# Patient Record
Sex: MALE | Race: WHITE
[De-identification: names, ages, dates, MRNs, and addresses within clinical notes are randomized per-mention and may not be internally consistent; named-entity substitution may affect disease eponyms.]

---

## 2018-03-03 ENCOUNTER — HOSPITAL ENCOUNTER (OUTPATIENT)
Dept: HOSPITAL 17 - NEPC | Age: 83
Setting detail: OBSERVATION
LOS: 2 days | Discharge: HOME HEALTH | End: 2018-03-05
Attending: HOSPITALIST | Admitting: HOSPITALIST
Payer: MEDICARE

## 2018-03-03 VITALS
RESPIRATION RATE: 18 BRPM | OXYGEN SATURATION: 96 % | HEART RATE: 72 BPM | SYSTOLIC BLOOD PRESSURE: 176 MMHG | DIASTOLIC BLOOD PRESSURE: 86 MMHG

## 2018-03-03 VITALS
TEMPERATURE: 96.1 F | RESPIRATION RATE: 18 BRPM | OXYGEN SATURATION: 95 % | DIASTOLIC BLOOD PRESSURE: 91 MMHG | HEART RATE: 70 BPM | SYSTOLIC BLOOD PRESSURE: 172 MMHG

## 2018-03-03 VITALS
TEMPERATURE: 98 F | HEART RATE: 70 BPM | OXYGEN SATURATION: 98 % | DIASTOLIC BLOOD PRESSURE: 65 MMHG | RESPIRATION RATE: 16 BRPM | SYSTOLIC BLOOD PRESSURE: 139 MMHG

## 2018-03-03 VITALS
OXYGEN SATURATION: 98 % | HEART RATE: 70 BPM | SYSTOLIC BLOOD PRESSURE: 175 MMHG | DIASTOLIC BLOOD PRESSURE: 78 MMHG | RESPIRATION RATE: 18 BRPM | TEMPERATURE: 96.1 F

## 2018-03-03 VITALS — HEIGHT: 72 IN | BODY MASS INDEX: 22.75 KG/M2 | WEIGHT: 167.99 LBS

## 2018-03-03 VITALS — OXYGEN SATURATION: 95 %

## 2018-03-03 DIAGNOSIS — E78.1: ICD-10-CM

## 2018-03-03 DIAGNOSIS — Z87.891: ICD-10-CM

## 2018-03-03 DIAGNOSIS — H54.8: ICD-10-CM

## 2018-03-03 DIAGNOSIS — H21.01: Primary | ICD-10-CM

## 2018-03-03 DIAGNOSIS — E11.51: ICD-10-CM

## 2018-03-03 DIAGNOSIS — E78.5: ICD-10-CM

## 2018-03-03 DIAGNOSIS — Z95.1: ICD-10-CM

## 2018-03-03 DIAGNOSIS — Z79.4: ICD-10-CM

## 2018-03-03 DIAGNOSIS — Z95.810: ICD-10-CM

## 2018-03-03 DIAGNOSIS — N28.9: ICD-10-CM

## 2018-03-03 DIAGNOSIS — E03.9: ICD-10-CM

## 2018-03-03 DIAGNOSIS — R79.1: ICD-10-CM

## 2018-03-03 DIAGNOSIS — Z95.5: ICD-10-CM

## 2018-03-03 DIAGNOSIS — Z79.01: ICD-10-CM

## 2018-03-03 DIAGNOSIS — I25.10: ICD-10-CM

## 2018-03-03 LAB
BASOPHILS # BLD AUTO: 0 TH/MM3 (ref 0–0.2)
BASOPHILS NFR BLD: 0.3 % (ref 0–2)
BUN SERPL-MCNC: 19 MG/DL (ref 7–18)
CALCIUM SERPL-MCNC: 9.2 MG/DL (ref 8.5–10.1)
CHLORIDE SERPL-SCNC: 100 MEQ/L (ref 98–107)
CREAT SERPL-MCNC: 1.56 MG/DL (ref 0.6–1.3)
EOSINOPHIL # BLD: 0.1 TH/MM3 (ref 0–0.4)
EOSINOPHIL NFR BLD: 0.9 % (ref 0–4)
ERYTHROCYTE [DISTWIDTH] IN BLOOD BY AUTOMATED COUNT: 13.6 % (ref 11.6–17.2)
GFR SERPLBLD BASED ON 1.73 SQ M-ARVRAT: 43 ML/MIN (ref 89–?)
GLUCOSE SERPL-MCNC: 247 MG/DL (ref 74–106)
HCO3 BLD-SCNC: 26.7 MEQ/L (ref 21–32)
HCT VFR BLD CALC: 45.6 % (ref 39–51)
HGB BLD-MCNC: 15.6 GM/DL (ref 13–17)
INR PPP: 3.4 RATIO
LYMPHOCYTES # BLD AUTO: 1 TH/MM3 (ref 1–4.8)
LYMPHOCYTES NFR BLD AUTO: 15.9 % (ref 9–44)
MCH RBC QN AUTO: 31.9 PG (ref 27–34)
MCHC RBC AUTO-ENTMCNC: 34.3 % (ref 32–36)
MCV RBC AUTO: 93.1 FL (ref 80–100)
MONOCYTE #: 0.7 TH/MM3 (ref 0–0.9)
MONOCYTES NFR BLD: 10.9 % (ref 0–8)
NEUTROPHILS # BLD AUTO: 4.6 TH/MM3 (ref 1.8–7.7)
NEUTROPHILS NFR BLD AUTO: 72 % (ref 16–70)
PLATELET # BLD: 123 TH/MM3 (ref 150–450)
PMV BLD AUTO: 8.5 FL (ref 7–11)
PROTHROMBIN TIME: 34.6 SEC (ref 9.8–11.6)
RBC # BLD AUTO: 4.9 MIL/MM3 (ref 4.5–5.9)
SODIUM SERPL-SCNC: 133 MEQ/L (ref 136–145)
WBC # BLD AUTO: 6.4 TH/MM3 (ref 4–11)

## 2018-03-03 PROCEDURE — 70486 CT MAXILLOFACIAL W/O DYE: CPT

## 2018-03-03 PROCEDURE — 80048 BASIC METABOLIC PNL TOTAL CA: CPT

## 2018-03-03 PROCEDURE — 93005 ELECTROCARDIOGRAM TRACING: CPT

## 2018-03-03 PROCEDURE — 82948 REAGENT STRIP/BLOOD GLUCOSE: CPT

## 2018-03-03 PROCEDURE — 85730 THROMBOPLASTIN TIME PARTIAL: CPT

## 2018-03-03 PROCEDURE — 96372 THER/PROPH/DIAG INJ SC/IM: CPT

## 2018-03-03 PROCEDURE — 70450 CT HEAD/BRAIN W/O DYE: CPT

## 2018-03-03 PROCEDURE — 85025 COMPLETE CBC W/AUTO DIFF WBC: CPT

## 2018-03-03 PROCEDURE — 99285 EMERGENCY DEPT VISIT HI MDM: CPT

## 2018-03-03 PROCEDURE — G0378 HOSPITAL OBSERVATION PER HR: HCPCS

## 2018-03-03 PROCEDURE — C9132 KCENTRA, PER I.U.: HCPCS

## 2018-03-03 PROCEDURE — 85610 PROTHROMBIN TIME: CPT

## 2018-03-03 RX ADMIN — INSULIN ASPART SCH: 100 INJECTION, SOLUTION INTRAVENOUS; SUBCUTANEOUS at 21:00

## 2018-03-03 RX ADMIN — CYCLOPENTOLATE HYDROCHLORIDE SCH DROP: 10 SOLUTION/ DROPS OPHTHALMIC at 19:59

## 2018-03-03 RX ADMIN — PREDNISOLONE ACETATE SCH DROP: 10 SUSPENSION/ DROPS OPHTHALMIC at 21:00

## 2018-03-03 RX ADMIN — INSULIN ASPART SCH: 100 INJECTION, SOLUTION INTRAVENOUS; SUBCUTANEOUS at 16:52

## 2018-03-03 RX ADMIN — STANDARDIZED SENNA CONCENTRATE AND DOCUSATE SODIUM SCH TAB: 8.6; 5 TABLET, FILM COATED ORAL at 21:34

## 2018-03-03 RX ADMIN — PREDNISOLONE ACETATE SCH DROP: 10 SUSPENSION/ DROPS OPHTHALMIC at 19:59

## 2018-03-03 RX ADMIN — CYCLOPENTOLATE HYDROCHLORIDE SCH DROP: 10 SOLUTION/ DROPS OPHTHALMIC at 21:00

## 2018-03-03 RX ADMIN — Medication SCH ML: at 21:35

## 2018-03-03 RX ADMIN — PRAVASTATIN SODIUM SCH MG: 80 TABLET ORAL at 21:33

## 2018-03-03 NOTE — PD
HPI


Chief Complaint:  Eye Problems/Injury


Time Seen by Provider:  10:05


Travel History


International Travel<30 days:  No


Contact w/Intl Traveler<30days:  No


Traveled to known affect area:  No





History of Present Illness


HPI


Patient complains of hazy right eye vision that started since last night while 

he was playing with his iPad.  Patient denies any eye pain.  Patient stated 

that he felt like he had some tearing in his right eye and when he started to 

press buttons on the iPad and he essentially the right eye became very blurred 

he just continue to play with one eye open particularly his left eye.  And did 

not seem to have any major concern until this morning when he could not see out 

of his right eye because it was very blurry.  Patient states that he is on 

Coumadin.





PFSH


Past Medical History


Hx Anticoagulant Therapy:  Yes (COUMADIN)


Cardiovascular Problems:  Yes (PACEMAKER)





Social History


Tobacco Use:  No





Allergies-Medications


(Allergen,Severity, Reaction):  


Coded Allergies:  


     No Known Allergies (Verified  Allergy, Unknown, 3/3/18)


     MRI PRECAUTION (Verified  Adverse Reaction, Unknown, NON COMPATIBLE 

PACEMAKER DEFIBRILLATOR, 3/4/18)


 MODEL # [id number], SERIAL #[id number], JAN [birth year] 2014


Reported Meds & Prescriptions





Reported Meds & Active Scripts


Active


Reported


Lantus Inj (Insulin Glargine) 1,000 Unit/10 Ml Vial 32 Units SQ DAILY


Preservision Areds (Multiple Vitamins W/ Minerals) 1 Tab 2 Tab PO DAILY


Aspirin 81 Mg Chew 81 Mg CHEW DAILY


Warfarin 5 Mg Tab 5 Mg PO DAILY


[Thyroxine]   25 Mg PO DAILY


Tricor (Fenofibrate) 48 Mg Tab 48 Mg PO DAILY


     Tke with food.


Simvastatin 40 Mg Tab 40 Mg PO HS


Januvia (Sitagliptin Phosphate) 100 Mg Tab 100 Mg PO DAILY


Digoxin 0.25 Mg Tab 0.25 Mg PO DAILY


Vitamin D3 (Cholecalciferol) 1,000 Unit Cap 1,000 Units  DAILY








Physical Exam


Narrative


GENERAL: 


SKIN: Warm and dry.


HEAD: Atraumatic. Normocephalic. 


EYES: Pupils equal and round. No scleral icterus. No injection or drainage.  

Noted is a right partial hyphema with 20% coverage max, after patient was 

seated position he is vision improved to motion.  Proparacaine and fluorescein 

test did not show any Victor Hugo's sign, did not show any uptake.  Pavan-Pen 

pressure 14 on left and 16 on right eye, additionally left eye is 20/400


ENT: No nasal bleeding or discharge.  Mucous membranes pink and moist.


NECK: Trachea midline. No JVD. 


CARDIOVASCULAR: Regular rate and rhythm.  


RESPIRATORY: No accessory muscle use. Clear to auscultation. Breath sounds 

equal bilaterally. 


GASTROINTESTINAL: Abdomen soft, non-tender, nondistended. 


MUSCULOSKELETAL: Extremities without clubbing, cyanosis, or edema. No obvious 

deformities. 


NEUROLOGICAL: Awake and alert. No obvious cranial nerve deficits.  Motor 

grossly within normal limits. Five out of 5 muscle strength in the arms and 

legs.  Normal speech.


PSYCHIATRIC: Appropriate mood and affect; insight and judgment normal.





Data


Data


Last Documented VS





Orders





 Orders


Complete Blood Count With Diff (3/3/18 10:06)


Basic Metabolic Panel (Bmp) (3/3/18 10:06)


Prothrombin Time / Inr (Pt) (3/3/18 10:06)


Act Partial Throm Time (Ptt) (3/3/18 10:06)


Ct Facial Bones W/O Iv Cont (3/3/18 )


Proparacaine 0.5% Opth Soln (Alcaine 0.5 (3/3/18 10:30)


^ Other Nursing Orders (3/3/18 10:18)


^ Lab Follow Up (3/3/18 13:04)


Prothrombin Complex Conc Inj (Kcentra In (3/3/18 13:45)


Admit Order (Ed Use Only) (3/3/18 13:48)





Labs





Laboratory Tests








Test


  3/3/18


10:05


 


White Blood Count 6.4 TH/MM3 


 


Red Blood Count 4.90 MIL/MM3 


 


Hemoglobin 15.6 GM/DL 


 


Hematocrit 45.6 % 


 


Mean Corpuscular Volume 93.1 FL 


 


Mean Corpuscular Hemoglobin 31.9 PG 


 


Mean Corpuscular Hemoglobin


Concent 34.3 % 


 


 


Red Cell Distribution Width 13.6 % 


 


Platelet Count 123 TH/MM3 


 


Mean Platelet Volume 8.5 FL 


 


Neutrophils (%) (Auto) 72.0 % 


 


Lymphocytes (%) (Auto) 15.9 % 


 


Monocytes (%) (Auto) 10.9 % 


 


Eosinophils (%) (Auto) 0.9 % 


 


Basophils (%) (Auto) 0.3 % 


 


Neutrophils # (Auto) 4.6 TH/MM3 


 


Lymphocytes # (Auto) 1.0 TH/MM3 


 


Monocytes # (Auto) 0.7 TH/MM3 


 


Eosinophils # (Auto) 0.1 TH/MM3 


 


Basophils # (Auto) 0.0 TH/MM3 


 


CBC Comment DIFF FINAL 


 


Differential Comment  


 


Prothrombin Time 34.6 SEC 


 


Prothromb Time International


Ratio 3.4 RATIO 


 


 


Activated Partial


Thromboplast Time 42.5 SEC 


 


 


Blood Urea Nitrogen 19 MG/DL 


 


Creatinine 1.56 MG/DL 


 


Random Glucose 247 MG/DL 


 


Calcium Level 9.2 MG/DL 


 


Sodium Level 133 MEQ/L 


 


Potassium Level 4.4 MEQ/L 


 


Chloride Level 100 MEQ/L 


 


Carbon Dioxide Level 26.7 MEQ/L 


 


Anion Gap 6 MEQ/L 


 


Estimat Glomerular Filtration


Rate 43 ML/MIN 


 











MDM


Medical Decision Making


Medical Screen Exam Complete:  Yes


Emergency Medical Condition:  Yes


Medical Record Reviewed:  Yes


Differential Diagnosis


Retrobulbar hemorrhage versus hyphema versus retinal hemorrhage


Narrative Course


On CT and abnormal soft tissue presumably hemorrhage in the superolateral right 

lobe anteriorly the remainder of the examination is unremarkable per 

radiologist.... Upon discussing further with the radiologist today he states 

that this hemorrhage is intraorbital within the orbit measuring approximately 

11 x 4 mm....coumadin stopped and kcentra given asap with repeat decline on 

repeat inr 3 to 1.7


Critical Care Narrative


CRITICAL CARE NOTE: With evaluation of the patient, labs, EKG, receipt of 

radiologic studies, administration of medications, reevaluation the patient and 

discussion of the patient with the admitting physicians, the total critical 

care time was [30] minutes. Time to perform other separately billable 

procedures was not included in the critical care time.





Physician Communication


Physician Communication


d/w dr jona chris on call for dr rosina mcnulty , who will follow patient up 

in hospital.





Diagnosis





 Primary Impression:  


 Hyphema, right eye


 Additional Impression:  


 Intraorbital hemorrhage right





Admitting Information


Admitting Physician Requests:  Observation


Scripts


Prednisolone Acetate Opth (Prednisolone Acetate Opth) 1% Susp


1 DROP RIGHT EYE QID for hyphema for 14 Days, ML


   Prov: Henri Jesus MD         3/5/18











Den Jones MD Mar 3, 2018 10:06

## 2018-03-03 NOTE — HHI.HP
__________________________________________________





[facility]


Service


[facility] ([place], [place])ists


Primary Care Physician


No Primary Care Physician


Admission Diagnosis


HYPHEMA AND INTRAORBITAL HEMORRHAGE RT EYE


Diagnoses:  


Travel History


International Travel<30 Days:  No


Contact w/Intl Traveler <30 Da:  No


Traveled to Known Affected Are:  No


History of Present Illness


82 year old  male with history of CAD, IDDM, and PAD presented to the 

ER for right visual loss. He reports yesterday evening he spent a few hours 

squinting with his right eye trying to see the iPad and when he put it down he 

realized his vision was blurry. This morning when he awoke he states he 

completely had no vision in that eye. His wife commented that when she looked 

at his eyes the right pupil was dilated. The patient states at baseline he is 

legally blind and his right eye vision is worse than his left. He denies any 

diplopia, photophobia, headache, nausea, or vomiting. He states he had some 

mild tenderness when he touched his right eyelid this morning otherwise the eye 

is not painful. At baseline, though, he is a little unstable on his feet and 

gets dizzy from time to time but nothing that has acutely worsened. He is on 

Coumadin reportedly for multiple bypass procedures and PAD, and recalls his 

last INR was 2.8 somewhere between 2-4 weeks ago. He denies bleeding elsewhere. 

He states he did not fall or have any recent trauma. He reports since this 

morning his vision has improved but is still poor.





He moved to the area over the summer of 2017 and his cardiologist and 

endocrinologist are in [place].





Review of Systems


Constitutional:  COMPLAINS OF: Dizziness, DENIES: Fever, Chills


Endocrine:  DENIES: Polyuria


Eyes:  COMPLAINS OF: Blurred vision, Vision loss, DENIES: Diplopia, Eye 

inflammation, Eye pain, Photosensitivity, Double Vision


Ears, nose, mouth, throat:  COMPLAINS OF: Hearing loss, DENIES: Throat pain, 

Hoarseness


Respiratory:  DENIES: Cough, Wheezing, Shortness of breath


Cardiovascular:  DENIES: Chest pain, Palpitations, Syncope, Lower Extremity 

Edema


Gastrointestinal:  DENIES: Abdominal pain, Black stools, Bloody stools, Diarrhea

, Nausea, Vomiting


Genitourinary:  DENIES: Hematuria


Musculoskeletal:  DENIES: Back pain


Integumentary:  DENIES: Rash


Hematologic/lymphatic:  DENIES: Bruising


Neurologic:  COMPLAINS OF: Poor Balance, DENIES: Abnormal gait, Headache, 

Localized weakness, Speech Problems


Psychiatric:  DENIES: Anxiety, Depression





Past Family Social History


Past Medical History


Diabetes mellitus


Coronary artery disease s/p CABG (last cath in 2018 showed 90% blockage in 

graft)


Peripheral artery disease


Hypothyroidism


Hyperlipidemia


Past Surgical History


Triple bypass x 2


7 cardiac stents


AICD placement


Reported Medications


Lantus Inj (Insulin Glargine) 1,000 Unit/10 Ml Vial 32 Units SQ DAILY


Preservision Areds (Multiple Vitamins W/ Minerals) 1 Tab 2 Tab PO DAILY


Aspirin 81 Mg Chew 81 Mg CHEW DAILY


Warfarin 5 Mg Tab 5 Mg PO DAILY


Synthroid 25 Mcg PO DAILY


Tricor (Fenofibrate) 48 Mg Tab 48 Mg PO DAILY


Simvastatin 40 Mg Tab 40 Mg PO HS


Januvia (Sitagliptin Phosphate) 100 Mg Tab 100 Mg PO DAILY


Digoxin 0.25 Mg Tab 0.25 Mg PO DAILY


Vitamin D3 (Cholecalciferol) 1,000 Unit Cap 1,000 Units  DAILY


Allergies:  


Coded Allergies:  


     No Known Allergies (Verified  Allergy, Unknown, 3/3/18)


Active Ordered Medications


Acetaminophen (Tylenol) 650 mg Q4H  PRN PO;  Start 3/3/18 at 14:45


Bisacodyl (Dulcolax Supp) 10 mg DAILY  PRN RECTAL;  Start 3/3/18 at 14:45


Cholecalciferol (Vitamin D3) 1,000 units DAILY PO;  Start 3/4/18 at 09:00


Dextrose (D50w (Vial) Inj) 50 ml UNSCH  PRN IV PUSH;  Start 3/3/18 at 14:45


Digoxin (Lanoxin) 0.25 mg DAILY PO;  Start 3/4/18 at 09:00


Fenofibrate (Tricor) 48 mg DAILY PO;  Start 3/4/18 at 09:00


Glucagon (Glucagon Inj) 1 mg UNSCH  PRN OTHER;  Start 3/3/18 at 14:45


Insulin Aspart (NovoLOG SUPPLEMENTAL SCALE) 1 ACHS SLIDING  SCALE SQ;  Start 3/3

/18 at 17:00


Insulin Detemir (Levemir Inj) 32 units DAILY SQ;  Start 3/4/18 at 09:00


Lactulose (Lactulose Liq) 30 ml DAILY  PRN PO;  Start 3/3/18 at 14:45


Levothyroxine Sodium (Synthroid) 25 mcg DAILY@0600 PO;  Start 3/4/18 at 06:00


Magnesium Hydroxide (Milk Of Magnesia Liq) 30 ml Q12H  PRN PO;  Start 3/3/18 at 

14:45


Multivitamins/ Minerals Therapeutic (Theragran M Tab) 2 tab DAILY PO;  Start 3/4

/18 at 09:00


Naloxone HCl (Narcan Inj) 0.4 mg UNSCH  PRN IV PUSH;  Start 3/3/18 at 14:45


Pravastatin Sodium (Pravachol) 80 mg HS PO;  Start 3/3/18 at 21:00


Proparacaine HCl (Alcaine 0.5% Opht Soln) 1 drop ONCE  ONCE RIGHT EYE;  Start 3/

3/18 at 10:30;  Stop 3/3/18 at 10:31;  Status DC


Prothrombin Complex Concent (Human) 2000 units/Syringe / Bag 0 ml @ 500 mls/hr 

ONCE  ONCE IV Last administered on 3/3/18at 14:09; Admin Dose 500 MLS/HR;  

Start 3/3/18 at 13:45;  Stop 3/3/18 at 13:46;  Status DC


Senna/Docusate Sodium (Roxane-Colace) 1 tab BID PO;  Start 3/3/18 at 21:00


Sennosides (Senokot) 17.2 mg Q12H  PRN PO;  Start 3/3/18 at 14:45


Sitagliptin Phosphate (Januvia) 100 mg DAILY PO;  Start 3/4/18 at 09:00


Sodium Chloride (NS Flush) 2 ml BID IV FLUSH;  Start 3/3/18 at 21:00


Sodium Chloride (NS Flush) 2 ml UNSCH  PRN IV FLUSH;  Start 3/3/18 at 14:45


Family History


Heart disease in parents


Social History


Lives with his wife


[employer]


EtOH: quit in 


Tobacco history: quit 





Physical Exam


Vital Signs





Vital Signs








  Date Time  Temp Pulse Resp B/P (MAP) Pulse Ox O2 Delivery O2 Flow Rate FiO2


 


3/3/18 10:02   18     


 


3/3/18 09:49 98.0 70 16 139/65 (89) 98   








Physical Exam


GENERAL: Well-nourished, well-developed elderly  male. Pleasant, 

sitting up comfortably in bed, in no acute distress.


SKIN: Warm and dry. Few ecchymoses over upper extremities.


HEENT: Atraumatic. Normocephalic. No temporal or scalp tenderness. Small 

hyphema right eye. Right pupil slightly larger than left, both reactive to 

light. Unable to assess accommodation as patient loses focus of my finger as it 

approaches closer to him. Extraocular motions intact. No scleral icterus. Nose 

without bleeding, purulent drainage or septal hematoma. Throat without erythema

, tonsillar hypertrophy or exudate. Uvula midline. Airway patent.


NECK: Trachea midline. No JVD or lymphadenopathy. Supple, nontender, no 

meningeal signs.


CARDIOVASCULAR: Regular rate and rhythm without murmurs, gallops, or rubs. 


RESPIRATORY: Clear to auscultation. Breath sounds equal bilaterally. No wheezes

, rales, or rhonchi.  


GASTROINTESTINAL: Abdomen soft, nontender, nondistended. No hepatosplenomegaly 

or palpable masses. No guarding.


MUSCULOSKELETAL: Extremities without clubbing, cyanosis, or edema. No joint 

tenderness, effusion, or edema noted. No calf tenderness. Negative Homans sign 

bilaterally.


NEUROLOGICAL: Awake and alert. Cranial nerves II through XII intact.  Motor and 

sensory grossly within normal limits. Normal speech. Extremely hard of hearing.


Laboratory





Laboratory Tests








Test


  3/3/18


10:05


 


White Blood Count 6.4 


 


Red Blood Count 4.90 


 


Hemoglobin 15.6 


 


Hematocrit 45.6 


 


Mean Corpuscular Volume 93.1 


 


Mean Corpuscular Hemoglobin 31.9 


 


Mean Corpuscular Hemoglobin


Concent 34.3 


 


 


Red Cell Distribution Width 13.6 


 


Platelet Count 123 


 


Mean Platelet Volume 8.5 


 


Neutrophils (%) (Auto) 72.0 


 


Lymphocytes (%) (Auto) 15.9 


 


Monocytes (%) (Auto) 10.9 


 


Eosinophils (%) (Auto) 0.9 


 


Basophils (%) (Auto) 0.3 


 


Neutrophils # (Auto) 4.6 


 


Lymphocytes # (Auto) 1.0 


 


Monocytes # (Auto) 0.7 


 


Eosinophils # (Auto) 0.1 


 


Basophils # (Auto) 0.0 


 


CBC Comment DIFF FINAL 


 


Differential Comment  


 


Prothrombin Time 34.6 


 


Prothromb Time International


Ratio 3.4 


 


 


Activated Partial


Thromboplast Time 42.5 


 


 


Blood Urea Nitrogen 19 


 


Creatinine 1.56 


 


Random Glucose 247 


 


Calcium Level 9.2 


 


Sodium Level 133 


 


Potassium Level 4.4 


 


Chloride Level 100 


 


Carbon Dioxide Level 26.7 


 


Anion Gap 6 


 


Estimat Glomerular Filtration


Rate 43 


 








Result Diagram:  


3/3/18 1005                                                                    

            3/3/18 1005





Imaging





Last Impressions








Maxillofacial CT 3/3/18 0000 Signed





Impressions: 





 Service Date/Time:  Saturday, March 3, 2018 10:57 - CONCLUSION:  Abnormal soft 





 tissue presumably hemorrhage in the supralateral right globe anteriorly. Rest 

of 





 study is unremarkable.     Edilson Dubose MD ADDENDUM:   The hemorrhages 





 within the globe.   Edilson Dubose MD 











Capjj VTE Risk Assessment


Caprinmadison VTE Risk Assessment:  Mod/High Risk (score >= 2)


VTE Pharm Contraindication:  Coagulopathy,INR elevated


Capkbi Risk Assessment Model











 Point Value = 1          Point Value = 2  Point Value = 3  Point Value = 5


 


Age 41-60


Minor surgery


BMI > 25 kg/m2


Swollen legs


Varicose veins


Pregnancy or postpartum


History of unexplained or recurrent


   spontaneous 


Oral contraceptives or hormone


   replacement


Sepsis (< 1 month)


Serious lung disease, including


   pneumonia (< 1 month)


Abnormal pulmonary function


Acute myocardial infarction


Congestive heart failure (< 1 month)


History of inflammatory bowel disease


Medical patient at bed rest Age 61-74


Arthroscopic surgery


Major open surgery (> 45 min)


Laparoscopic surgery (> 45 min)


Malignancy


Confined to bed (> 72 hours)


Immobilizing plaster cast


Central venous access Age >= 75


History of VTE


Family history of VTE


Factor V Leiden


Prothrombin 58448S


Lupus anticoagulant


Anticardiolipin antibodies


Elevated serum homocysteine


Heparin-induced thrombocytopenia


Other congenital or acquired


   thrombophilia Stroke (< 1 month)


Elective arthroplasty


Hip, pelvis, or leg fracture


Acute spinal cord injury (< 1 month)








Prophylaxis Regimen











   Total Risk


Factor Score Risk Level Prophylaxis Regimen


 


0-1      Low Early ambulation


 


2 Moderate Order ONE of the following:


*Sequential Compression Device (SCD)


*Heparin 5000 units SQ BID


 


3-4 Higher Order ONE of the following medications:


*Heparin 5000 units SQ TID


*Enoxaparin/Lovenox 40 mg SQ daily (WT < 150 kg, CrCl > 30 mL/min)


*Enoxaparin/Lovenox 30 mg SQ daily (WT < 150 kg, CrCl > 10-29 mL/min)


*Enoxaparin/Lovenox 30 mg SQ BID (WT < 150 kg, CrCl > 30 mL/min)


AND/OR


*Sequential Compression Device (SCD)


 


5 or more Highest Order ONE of the following medications:


*Heparin 5000 units SQ TID (Preferred with Epidurals)


*Enoxaparin/Lovenox 40 mg SQ daily (WT < 150 kg, CrCl > 30 mL/min)


*Enoxaparin/Lovenox 30 mg SQ daily (WT < 150 kg, CrCl > 10-29 mL/min)


*Enoxaparin/Lovenox 30 mg SQ BID (WT < 150 kg, CrCl > 30 mL/min)


AND


*Sequential Compression Device (SCD)











Assessment and Plan


Problem List:  


(1) Hyphema, right eye


ICD Code:  H21.01 - Hyphema, right eye


Status:  Acute


(2) Supratherapeutic INR


ICD Code:  R79.1 - Abnormal coagulation profile


(3) CAD (coronary artery disease)


ICD Code:  I25.10 - Atherosclerotic heart disease of native coronary artery 

without angina pectoris


Status:  Chronic


(4) PAD (peripheral artery disease)


ICD Code:  I73.9 - Peripheral vascular disease, unspecified


Status:  Chronic


(5) Hypothyroidism


ICD Code:  E03.9 - Hypothyroidism, unspecified


Status:  Chronic


(6) Diabetes mellitus


ICD Code:  E11.9 - Type 2 diabetes mellitus without complications


Status:  Chronic


Assessment and Plan


82-year-old male admitted under observation for evaluation of right eye hyphema 

and associated visual loss, and patient was found to have supratherapeutic INR 

of 3.4.





Right eye hyphema and visual loss


CT reveals hemorrhage within the right globe


Clinically, the hyphema is small, nontraumatic, and nonpainful 


Ophthalmology consulted


INR 3.4 in the ED; hold Coumadin and ASA


Vitamin K ordered and administered in the ED





Diabetes mellitus


SSI per protocol


Continue home Lantus and Januvia





Coronary artery disease


Continue digoxin


Holding aspirin and Coumadin in light of hyphema


Will attempt to reach out to cardiologist to discuss continuing Coumadin





Hyperlipidemia/hypertriglyceridemia


Continue home fenofibrate and statin





Hypothyroidism


Continue home Synthroid





Renal insufficiency


No baseline creatinine to compare to 


Will monitor and avoid nephrotoxic agents





DVT prophylaxis


Holding chemical anticoagulation given supratherapeutic INR





D/C Planning


Anticipate D/C tomorrow with f/u with ophthalmologist


Code Status


FULL


Discussed Condition With


Dr. Jones, patient, and family











Lanette Robert MD Mar 3, 2018 14:08

## 2018-03-03 NOTE — RADRPT
EXAM DATE/TIME:  03/03/2018 10:57 

 

This report includes an Addendum and supersedes previous reports for this exam.

 

 

 

 

[facility] COMPARISON:     

No previous studies available for comparison.

 

 

INDICATIONS :     

Right eye draining, pupil dialated; evaluate for retrobulbar hemorrhage. 

                      

 

RADIATION DOSE:     

37.66 CTDIvol (mGy) 

 

 

MEDICAL HISTORY :     

Stroke. Cardiovascular disease Diabetes mellitus type 2.

 

SURGICAL HISTORY :      

Pacemaker. Defibrillator.CABG

 

ENCOUNTER:      

Initial

 

ACUITY:      

1 day

 

PAIN SCORE:      

3/10

 

LOCATION:       

Right facial 

 

TECHNIQUE:     

Volumetric scanning of the facial bones was performed.  Using automated exposure control and adjustme
nt of the mA and/or kV according to patient size, radiation dose was kept as low as reasonably achiev
able to obtain optimal diagnostic quality images.  DICOM format image data is available electronicall
y for review and comparison.  

 

FINDINGS:     

 

ORBITS:     

There is abnormal soft tissue in the superior lateral right globe presumably hemorrhage. The rest of 
the right globe and the entire left lobe are unremarkable.  Abnormal soft tissue measures 11 x 4 mm

 

NASAL BONE:     

The nasal bone and maxillary spine are intact

 

ZYGOMATIC ARCHES:     

Symmetric without evidence of fracture.

 

SINUSES:     

The maxillary, ethmoid and frontal sinuses are intact.  No air-fluid levels seen.

 

NASAL CAVITY:     

The nasal septum is intact and midline.  The lacrimal ducts are intact.

 

SOFT TISSUES:     

No radiopaque foreign bodies seen.  No soft-tissue swelling is seen.

 

INTRACRANIAL:     

No intracranial air seen.

 

CRIBIFORM PLATE:     

Grossly intact.

 

CONCLUSION:     

Abnormal soft tissue presumably hemorrhage in the supralateral right globe anteriorly. Rest of study 
is unremarkable.

 

 

 

 Edilson Dubose MD on March 03, 2018 at 11:18           

Board Certified Radiologist.

 This report was verified electronically. 

 

ADDENDUM: 

 

The hemorrhages within the globe.

 

 Edilson Dubose MD on March 03, 2018 at 13:19           

Board Certified Radiologist.

 This report was verified electronically.

## 2018-03-04 VITALS
RESPIRATION RATE: 18 BRPM | OXYGEN SATURATION: 98 % | DIASTOLIC BLOOD PRESSURE: 67 MMHG | TEMPERATURE: 96.6 F | SYSTOLIC BLOOD PRESSURE: 121 MMHG | HEART RATE: 70 BPM

## 2018-03-04 VITALS
RESPIRATION RATE: 18 BRPM | TEMPERATURE: 96.8 F | SYSTOLIC BLOOD PRESSURE: 121 MMHG | HEART RATE: 70 BPM | DIASTOLIC BLOOD PRESSURE: 72 MMHG | OXYGEN SATURATION: 97 %

## 2018-03-04 VITALS
DIASTOLIC BLOOD PRESSURE: 76 MMHG | SYSTOLIC BLOOD PRESSURE: 150 MMHG | RESPIRATION RATE: 18 BRPM | TEMPERATURE: 96.7 F | HEART RATE: 70 BPM | OXYGEN SATURATION: 99 %

## 2018-03-04 VITALS
OXYGEN SATURATION: 97 % | HEART RATE: 71 BPM | RESPIRATION RATE: 18 BRPM | SYSTOLIC BLOOD PRESSURE: 140 MMHG | TEMPERATURE: 96.4 F | DIASTOLIC BLOOD PRESSURE: 73 MMHG

## 2018-03-04 VITALS
SYSTOLIC BLOOD PRESSURE: 126 MMHG | OXYGEN SATURATION: 99 % | TEMPERATURE: 96.8 F | RESPIRATION RATE: 18 BRPM | DIASTOLIC BLOOD PRESSURE: 66 MMHG | HEART RATE: 70 BPM

## 2018-03-04 VITALS
DIASTOLIC BLOOD PRESSURE: 71 MMHG | HEART RATE: 70 BPM | OXYGEN SATURATION: 98 % | SYSTOLIC BLOOD PRESSURE: 141 MMHG | RESPIRATION RATE: 17 BRPM | TEMPERATURE: 96.9 F

## 2018-03-04 VITALS
SYSTOLIC BLOOD PRESSURE: 120 MMHG | HEART RATE: 70 BPM | OXYGEN SATURATION: 98 % | TEMPERATURE: 96.2 F | RESPIRATION RATE: 18 BRPM | DIASTOLIC BLOOD PRESSURE: 64 MMHG

## 2018-03-04 LAB
BASOPHILS # BLD AUTO: 0 TH/MM3 (ref 0–0.2)
BASOPHILS NFR BLD: 0.3 % (ref 0–2)
BUN SERPL-MCNC: 16 MG/DL (ref 7–18)
CALCIUM SERPL-MCNC: 8.9 MG/DL (ref 8.5–10.1)
CHLORIDE SERPL-SCNC: 103 MEQ/L (ref 98–107)
CREAT SERPL-MCNC: 1.24 MG/DL (ref 0.6–1.3)
EOSINOPHIL # BLD: 0.1 TH/MM3 (ref 0–0.4)
EOSINOPHIL NFR BLD: 1.3 % (ref 0–4)
ERYTHROCYTE [DISTWIDTH] IN BLOOD BY AUTOMATED COUNT: 13.4 % (ref 11.6–17.2)
GFR SERPLBLD BASED ON 1.73 SQ M-ARVRAT: 56 ML/MIN (ref 89–?)
GLUCOSE SERPL-MCNC: 183 MG/DL (ref 74–106)
HCO3 BLD-SCNC: 29.6 MEQ/L (ref 21–32)
HCT VFR BLD CALC: 42.8 % (ref 39–51)
HGB BLD-MCNC: 15.1 GM/DL (ref 13–17)
INR PPP: 1.6 RATIO
INR PPP: 1.7 RATIO
LYMPHOCYTES # BLD AUTO: 1.3 TH/MM3 (ref 1–4.8)
LYMPHOCYTES NFR BLD AUTO: 19.1 % (ref 9–44)
MCH RBC QN AUTO: 32.4 PG (ref 27–34)
MCHC RBC AUTO-ENTMCNC: 35.2 % (ref 32–36)
MCV RBC AUTO: 92.1 FL (ref 80–100)
MONOCYTE #: 0.8 TH/MM3 (ref 0–0.9)
MONOCYTES NFR BLD: 12.2 % (ref 0–8)
NEUTROPHILS # BLD AUTO: 4.6 TH/MM3 (ref 1.8–7.7)
NEUTROPHILS NFR BLD AUTO: 67.1 % (ref 16–70)
PLATELET # BLD: 116 TH/MM3 (ref 150–450)
PMV BLD AUTO: 8.9 FL (ref 7–11)
PROTHROMBIN TIME: 16.6 SEC (ref 9.8–11.6)
PROTHROMBIN TIME: 16.7 SEC (ref 9.8–11.6)
RBC # BLD AUTO: 4.64 MIL/MM3 (ref 4.5–5.9)
SODIUM SERPL-SCNC: 137 MEQ/L (ref 136–145)
WBC # BLD AUTO: 6.9 TH/MM3 (ref 4–11)

## 2018-03-04 RX ADMIN — VITAMIN D, TAB 1000IU (100/BT) SCH UNITS: 25 TAB at 08:28

## 2018-03-04 RX ADMIN — DIGOXIN SCH MG: 250 TABLET ORAL at 08:28

## 2018-03-04 RX ADMIN — Medication SCH ML: at 21:24

## 2018-03-04 RX ADMIN — STANDARDIZED SENNA CONCENTRATE AND DOCUSATE SODIUM SCH TAB: 8.6; 5 TABLET, FILM COATED ORAL at 21:19

## 2018-03-04 RX ADMIN — INSULIN ASPART SCH: 100 INJECTION, SOLUTION INTRAVENOUS; SUBCUTANEOUS at 12:53

## 2018-03-04 RX ADMIN — LEVOTHYROXINE SODIUM SCH MCG: 25 TABLET ORAL at 06:17

## 2018-03-04 RX ADMIN — CYCLOPENTOLATE HYDROCHLORIDE SCH DROP: 10 SOLUTION/ DROPS OPHTHALMIC at 08:30

## 2018-03-04 RX ADMIN — Medication SCH ML: at 08:28

## 2018-03-04 RX ADMIN — INSULIN ASPART SCH: 100 INJECTION, SOLUTION INTRAVENOUS; SUBCUTANEOUS at 21:18

## 2018-03-04 RX ADMIN — FENOFIBRATE SCH MG: 48 TABLET ORAL at 08:28

## 2018-03-04 RX ADMIN — PREDNISOLONE ACETATE SCH DROP: 10 SUSPENSION/ DROPS OPHTHALMIC at 18:10

## 2018-03-04 RX ADMIN — PREDNISOLONE ACETATE SCH DROP: 10 SUSPENSION/ DROPS OPHTHALMIC at 12:53

## 2018-03-04 RX ADMIN — CYCLOPENTOLATE HYDROCHLORIDE SCH DROP: 10 SOLUTION/ DROPS OPHTHALMIC at 21:18

## 2018-03-04 RX ADMIN — INSULIN ASPART SCH: 100 INJECTION, SOLUTION INTRAVENOUS; SUBCUTANEOUS at 08:00

## 2018-03-04 RX ADMIN — STANDARDIZED SENNA CONCENTRATE AND DOCUSATE SODIUM SCH TAB: 8.6; 5 TABLET, FILM COATED ORAL at 08:28

## 2018-03-04 RX ADMIN — PRAVASTATIN SODIUM SCH MG: 80 TABLET ORAL at 21:19

## 2018-03-04 RX ADMIN — ACYCLOVIR SCH UNITS: 800 TABLET ORAL at 08:30

## 2018-03-04 RX ADMIN — PREDNISOLONE ACETATE SCH DROP: 10 SUSPENSION/ DROPS OPHTHALMIC at 08:30

## 2018-03-04 RX ADMIN — INSULIN ASPART SCH: 100 INJECTION, SOLUTION INTRAVENOUS; SUBCUTANEOUS at 16:39

## 2018-03-04 RX ADMIN — MULTIPLE VITAMINS W/ MINERALS TAB SCH TAB: TAB at 21:19

## 2018-03-04 RX ADMIN — PREDNISOLONE ACETATE SCH DROP: 10 SUSPENSION/ DROPS OPHTHALMIC at 21:18

## 2018-03-04 NOTE — EKG
Date Performed: 03/03/2018       Time Performed: 13:01:28

 

PTAGE:      82 years

 

EKG:      ELECTRONIC VENTRICULAR PACEMAKER ABNORMAL RHYTHM ECG 

 

NO PREVIOUS TRACING            

 

DOCTOR:   Edilson Traore  Interpretating Date/Time  03/04/2018 11:13:06

## 2018-03-04 NOTE — HHI.PR
Subjective


Remarks


Pt seen and examined. Wife at bedside. Labs and vitals reviewed. AFVSS. No 

acute events overnight. States vision is unchanged from yesterday and still 

feels like a film is over his right eye. Complains of some soreness over his 

upper eyelid when he pressed. Denies diplopia, dizziness, headache, nausea, or 

vomiting. Reiterates that he was closing his right eye very tightly in order to 

see out of his left eye while he played Solitaire on his iPad. Wife states he 

has had no changes in mentation, slurred speech, or unusual behavior. They are 

concerned about whether or not Coumadin vs. another agent needs to be resumed 

for his heart. A call was placed to his cardiologist yesterday but he has yet 

to be reached.





Objective


Vitals





Vital Signs








  Date Time  Temp Pulse Resp B/P (MAP) Pulse Ox O2 Delivery O2 Flow Rate FiO2


 


3/4/18 04:00 96.2 70 18 120/64 (82) 98   


 


3/4/18 00:00 96.4 71 18 140/73 (95) 97   


 


3/3/18 21:27 96.1 70 18 175/78 (110) 98   


 


3/3/18 19:10     95   


 


3/3/18 15:17 96.1 70 18 172/91 (118) 95   


 


3/3/18 14:10  72 18 176/86 (116) 96 Room Air  


 


3/3/18 10:02   18     


 


3/3/18 09:49 98.0 70 16 139/65 (89) 98   














I/O      


 


 3/3/18 3/3/18 3/3/18 3/4/18 3/4/18 3/4/18





 07:00 15:00 23:00 07:00 15:00 23:00


 


Intake Total   240 ml 240 ml  


 


Balance   240 ml 240 ml  


 


      


 


Intake Oral   240 ml 240 ml  


 


# Voids   1 1  


 


# Bowel Movements   0 0  








Result Diagram:  


3/4/18 0132                                                                    

            3/4/18 0132





Imaging





Last Impressions








Maxillofacial CT 3/3/18 0000 Signed





Impressions: 





 Service Date/Time:  Saturday, March 3, 2018 10:57 - CONCLUSION:  Abnormal soft 





 tissue presumably hemorrhage in the supralateral right globe anteriorly. Rest 

of 





 study is unremarkable.     Edilson Dubose MD ADDENDUM:   The hemorrhages 





 within the globe.   Edilson Dubose MD 








Objective Remarks


GENERAL: WN, WD elderly  male. Pleasant, sitting up comfortably in bed

, in no acute distress.


SKIN: Warm and dry. Few ecchymoses over upper extremities.


HEENT: AT/NC. Small hyphema right eye. Right pupil dilated to about 4-5 mm, 

left pupil about 2-3 mm. Left pupil reactive, right pupil nonreactive. Darker 

discoloration to right iris compared to left. Small hyphemia R chamber. Unable 

to assess accommodation as patient loses focus of my finger as it approaches 

closer to him. Extraocular motions intact. No scleral icterus or injection. 


NECK: No JVD or tender LAD.


CARDIOVASCULAR: RRR no m/r/g. Pacemaker left chest. Median sternotomy scar.


RESPIRATORY: CTAB w/o wheezes or crackles.


GASTROINTESTINAL: Abdomen soft, NT, ND.


MUSCULOSKELETAL: Extremities without clubbing, cyanosis, or edema. No calf 

tenderness. Strength 5/5 bilaterally and symmetrically.


NEUROLOGICAL: Awake and alert. CN II-XII intact. Normal speech. Extremely hard 

of hearing.





A/P


Problem List:  


(1) Hyphema, right eye


ICD Code:  H21.01 - Hyphema, right eye


Status:  Acute


(2) Supratherapeutic INR


ICD Code:  R79.1 - Abnormal coagulation profile


(3) CAD (coronary artery disease)


ICD Code:  I25.10 - Atherosclerotic heart disease of native coronary artery 

without angina pectoris


Status:  Chronic


(4) PAD (peripheral artery disease)


ICD Code:  I73.9 - Peripheral vascular disease, unspecified


Status:  Chronic


(5) Hypothyroidism


ICD Code:  E03.9 - Hypothyroidism, unspecified


Status:  Chronic


(6) Diabetes mellitus


ICD Code:  E11.9 - Type 2 diabetes mellitus without complications


Status:  Chronic


Assessment and Plan


82-year-old male admitted under observation for evaluation of right eye hyphema 

and associated visual loss, and patient was found to have supratherapeutic INR 

of 3.4.





Right eye hyphema and visual loss


CT reveals hemorrhage within the right globe


Ophthalmology consulted


Prednisolone 1% gtt R eye QID


Cyclogyl 1% gtt R eye BID


INR 3.4 in the ED; Coumadin and ASA held


Vitamin K ordered and administered in the ED, INR down to 1.7 this morning


Check MRI brain given cardiac history and visual loss in R eye





Diabetes mellitus


SSI per protocol


Continue home Lantus and Januvia





Coronary artery disease


Continue digoxin


Holding aspirin and Coumadin in light of hyphema


Will again attempt to reach out to cardiologist to discuss whether or not to 

continue Coumadin 





Hyperlipidemia/hypertriglyceridemia


Continue home fenofibrate and statin





Hypothyroidism


Continue home Synthroid





Renal insufficiency


No baseline creatinine to compare to 


Creatinine improved





DVT prophylaxis


Holding chemical anticoagulation given supratherapeutic INR


Discharge Planning


Pending evaluation by ophthalmology and MRI results, possibly tomorrow











Lanette Robert MD Mar 4, 2018 07:37

## 2018-03-04 NOTE — RADRPT
EXAM DATE/TIME:  03/04/2018 13:26 

 

[facility] COMPARISON:     

No previous studies available for comparison.

 

 

INDICATIONS :     

Right visual loss, intraorbital hemorrage

                      

 

RADIATION DOSE:     

49.75 CTDIvol (mGy) 

 

 

 

MEDICAL HISTORY :     

Cardiovascular disease. Cerebrovascular disease.  Hypothyroidism.Diabetes

 

SURGICAL HISTORY :      

CABG Pacemaker.

 

ENCOUNTER:      

Initial

 

ACUITY:      

1 day

 

PAIN SCALE:      

0/10

 

LOCATION:        

cranial 

 

TECHNIQUE:     

Multiple contiguous axial images were obtained of the head.  Using automated exposure control and adj
ustment of the mA and/or kV according to patient size, radiation dose was kept as low as reasonably a
chievable to obtain optimal diagnostic quality images.   DICOM format image data is available electro
nically for review and comparison.  

 

FINDINGS:     

 

CEREBRUM:     

Mild atrophy with minimal tentorial calcifications..  No evidence of midline shift, mass lesion, hemo
rrhage or acute infarction.  No extra-axial fluid collections are seen.

 

POSTERIOR FOSSA:     

The cerebellum and brainstem are intact.  The 4th ventricle is midline.  The cerebellopontine angle i
s unremarkable.

 

EXTRACRANIAL:     

The visualized portion of the orbits is intact.

 

SKULL:     

The calvaria is intact.  No evidence of skull fracture.

 

CONCLUSION:     Atrophy otherwise negative.  Orbits appear unremarkable 

 

 

 Dhruv Deshpande MD FACR on March 04, 2018 at 13:37           

Board Certified Radiologist.

 This report was verified electronically.

## 2018-03-05 VITALS
SYSTOLIC BLOOD PRESSURE: 134 MMHG | RESPIRATION RATE: 15 BRPM | OXYGEN SATURATION: 96 % | DIASTOLIC BLOOD PRESSURE: 63 MMHG | HEART RATE: 70 BPM | TEMPERATURE: 96.2 F

## 2018-03-05 VITALS
OXYGEN SATURATION: 95 % | RESPIRATION RATE: 16 BRPM | TEMPERATURE: 96.1 F | DIASTOLIC BLOOD PRESSURE: 65 MMHG | SYSTOLIC BLOOD PRESSURE: 119 MMHG | HEART RATE: 70 BPM

## 2018-03-05 LAB
INR PPP: 1.5 RATIO
PROTHROMBIN TIME: 14.9 SEC (ref 9.8–11.6)

## 2018-03-05 RX ADMIN — ACYCLOVIR SCH UNITS: 800 TABLET ORAL at 08:04

## 2018-03-05 RX ADMIN — LEVOTHYROXINE SODIUM SCH MCG: 25 TABLET ORAL at 06:33

## 2018-03-05 RX ADMIN — CYCLOPENTOLATE HYDROCHLORIDE SCH DROP: 10 SOLUTION/ DROPS OPHTHALMIC at 07:49

## 2018-03-05 RX ADMIN — FENOFIBRATE SCH MG: 48 TABLET ORAL at 07:49

## 2018-03-05 RX ADMIN — MULTIPLE VITAMINS W/ MINERALS TAB SCH TAB: TAB at 07:49

## 2018-03-05 RX ADMIN — VITAMIN D, TAB 1000IU (100/BT) SCH UNITS: 25 TAB at 07:49

## 2018-03-05 RX ADMIN — Medication SCH ML: at 07:50

## 2018-03-05 RX ADMIN — INSULIN ASPART SCH: 100 INJECTION, SOLUTION INTRAVENOUS; SUBCUTANEOUS at 12:31

## 2018-03-05 RX ADMIN — PREDNISOLONE ACETATE SCH DROP: 10 SUSPENSION/ DROPS OPHTHALMIC at 07:50

## 2018-03-05 RX ADMIN — PREDNISOLONE ACETATE SCH DROP: 10 SUSPENSION/ DROPS OPHTHALMIC at 12:31

## 2018-03-05 RX ADMIN — INSULIN ASPART SCH: 100 INJECTION, SOLUTION INTRAVENOUS; SUBCUTANEOUS at 08:00

## 2018-03-05 RX ADMIN — DIGOXIN SCH MG: 250 TABLET ORAL at 07:49

## 2018-03-05 RX ADMIN — STANDARDIZED SENNA CONCENTRATE AND DOCUSATE SODIUM SCH TAB: 8.6; 5 TABLET, FILM COATED ORAL at 07:50

## 2018-03-05 NOTE — HHI.PR
Subjective


Remarks








Patient seen this morning around 10 AM.  Says he is feeling all right.  Denies 

any chest pain or shortness of breath.  Denies any nausea or vomiting.  Denies 

any dizziness.  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





Objective





Vital Signs








  Date Time  Temp Pulse Resp B/P (MAP) Pulse Ox O2 Delivery O2 Flow Rate FiO2


 


3/5/18 12:00 96.2 70 15 134/63 (86) 96   


 


3/5/18 08:00 96.1 70 16 119/65 (83) 95   


 


3/4/18 23:44 96.9 70 17 141/71 (94) 98   


 


3/4/18 19:37 96.8 70 18 126/66 (86) 99   


 


3/4/18 16:00 96.7 70 18 150/76 (100) 99   














I/O      


 


 3/4/18 3/4/18 3/4/18 3/5/18 3/5/18 3/5/18





 07:00 15:00 23:00 07:00 15:00 23:00


 


Intake Total 240 ml 960 ml 360 ml 240 ml  


 


Balance 240 ml 960 ml 360 ml 240 ml  


 


      


 


Intake Oral 240 ml 960 ml 360 ml 240 ml  


 


# Voids 1 4 2 2  


 


# Bowel Movements 0 1 0 0  








Result Diagram:  


3/4/18 0132                                                                    

            3/4/18 0132





Objective Remarks


GENERAL: Patient sitting up in chair.  Appears comfortable.


SKIN: Warm and dry.


HEAD: Normocephalic.


EYES: No scleral icterus. No injection or drainage.  Right eye nonreactive.


NECK: Supple, trachea midline. No JVD.


CARDIOVASCULAR: Regular rate and rhythm without murmurs, gallops, or rubs. 


RESPIRATORY: Breath sounds equal bilaterally. No accessory muscle use.


GASTROINTESTINAL: Abdomen soft, non-tender, nondistended. 


MUSCULOSKELETAL: No cyanosis, or edema. 


BACK: Nontender without obvious deformity. No CVA tenderness.








A/P


Assessment and Plan


82-year-old male admitted under observation for evaluation of right eye hyphema 

and associated visual loss, and patient was found to have supratherapeutic INR 

of 3.4.





//Right eye hyphema and visual loss


CT reveals hemorrhage within the right globe


Ophthalmology consulted


Prednisolone 1% gtt R eye QID


Cyclogyl 1% gtt R eye BID


INR 3.4 in the ED; Coumadin and ASA held


Vitamin K ordered and administered in the ED, INR down to 1.7 this morning


-Patient has defibrillator and compatible with MRI.





//Diabetes mellitus


SSI per protocol


Continue home Lantus and Januvia





//Coronary artery disease


Continue digoxin


Holding aspirin and Coumadin in light of hyphema


Will again attempt to reach out to cardiologist to discuss whether or not to 

continue Coumadin 


= Continue Coumadin as per ophthalmology.





//Hyperlipidemia/hypertriglyceridemia


Continue home fenofibrate and statin





//Hypothyroidism


Continue home Synthroid





//Renal insufficiency


No baseline creatinine to compare to 


Creatinine improved





DVT prophylaxis


Holding chemical anticoagulation given supratherapeutic INR


Discharge Planning


Discharge home











Henri Jesus MD Mar 5, 2018 14:21

## 2018-03-05 NOTE — PD.CONS
History of Present Illness


Service


Ophthalmology


Consult Requested By





Reason for Consult


hyphema OD


Primary Care Physician


No Primary Care Physician


Diagnoses:  


History of Present Illness


82 year old  male with history of CAD, IDDM, and PAD presented to the 

ER 2 days ago for vision loss in the right eye. He woke up and realized he 

completely had no vision in that eye. His wife commented that when she looked 

at his eyes the right pupil was dilated. He was diagnosed with a hyphema/

intraocular bleed, and was found to have a supratherapeutic INR from Coumadin. 

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  He reports 

that his vision has improved over the last 2 days.





Past Family Social History


Allergies:  


Coded Allergies:  


     No Known Allergies (Verified  Allergy, Unknown, 3/3/18)


     MRI PRECAUTION (Verified  Adverse Reaction, Unknown, NON COMPATIBLE 

PACEMAKER DEFIBRILLATOR, 3/4/18)


 MODEL # [id number], SERIAL #[id number], JAN [birth year] 2014





Physical Exam


Vital Signs





Vital Signs








  Date Time  Temp Pulse Resp B/P (MAP) Pulse Ox O2 Delivery O2 Flow Rate FiO2


 


3/5/18 12:00 96.2 70 15 134/63 (86) 96   


 


3/5/18 08:00 96.1 70 16 119/65 (83) 95   


 


3/4/18 23:44 96.9 70 17 141/71 (94) 98   


 


3/4/18 19:37 96.8 70 18 126/66 (86) 99   


 


3/4/18 16:00 96.7 70 18 150/76 (100) 99   








Physical Exam


Va cc at near OD CF, OS CF


EOM full OU, no diplopia


CVF full OU


Pupils 2-1 OS, 3mm OD


IOP 14 OU





Anterior exam


OD - normal eyelid, C/S W&Q, K clear, AC deep, pupil round, PCIOL


OS - normal eyelid, C/S W&Q, K clear, AC deep, pupil round, PCIOL


Laboratory





Laboratory Tests








Test


  3/5/18


08:15


 


Prothrombin Time 14.9 


 


Prothromb Time International


Ratio 1.5 


 








Result Diagram:  


3/4/18 0132                                                                    

            3/4/18 0132








Assessment and Plan


Problem List:  


(1) Hyphema, right eye


ICD Codes:  H21.01 - Hyphema, right eye


Status:  Acute


Plan:  Continue Pred Forte QID OD. Can stop Cyclogyl. Ok to be discharged from 

Ophthalmology standpoint. Ok to be restarted on Coumadin. Follow up as 

outpatient.














Tatiana Adams MD Mar 5, 2018 13:26

## 2018-05-20 ENCOUNTER — HOSPITAL ENCOUNTER (EMERGENCY)
Dept: HOSPITAL 17 - NEPD | Age: 83
Discharge: HOME | End: 2018-05-20
Payer: MEDICARE

## 2018-05-20 VITALS — WEIGHT: 169.76 LBS | HEIGHT: 72 IN | BODY MASS INDEX: 22.99 KG/M2

## 2018-05-20 VITALS
RESPIRATION RATE: 18 BRPM | SYSTOLIC BLOOD PRESSURE: 150 MMHG | HEART RATE: 75 BPM | DIASTOLIC BLOOD PRESSURE: 75 MMHG | OXYGEN SATURATION: 97 % | TEMPERATURE: 97.3 F

## 2018-05-20 DIAGNOSIS — Z79.01: ICD-10-CM

## 2018-05-20 DIAGNOSIS — I25.10: ICD-10-CM

## 2018-05-20 DIAGNOSIS — Z79.84: ICD-10-CM

## 2018-05-20 DIAGNOSIS — Z95.1: ICD-10-CM

## 2018-05-20 DIAGNOSIS — S51.811A: Primary | ICD-10-CM

## 2018-05-20 DIAGNOSIS — E78.00: ICD-10-CM

## 2018-05-20 DIAGNOSIS — E11.9: ICD-10-CM

## 2018-05-20 DIAGNOSIS — Z23: ICD-10-CM

## 2018-05-20 DIAGNOSIS — E03.9: ICD-10-CM

## 2018-05-20 DIAGNOSIS — Z79.4: ICD-10-CM
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
## 2018-05-20 DIAGNOSIS — Z95.0: ICD-10-CM

## 2018-05-20 DIAGNOSIS — Z86.73: ICD-10-CM

## 2018-05-20 PROCEDURE — 90714 TD VACC NO PRESV 7 YRS+ IM: CPT

## 2018-05-20 PROCEDURE — 12001 RPR S/N/AX/GEN/TRNK 2.5CM/<: CPT

## 2018-05-20 PROCEDURE — 90471 IMMUNIZATION ADMIN: CPT

## 2018-05-20 NOTE — PD
HPI


Chief Complaint:  Laceration/Skin Injury


Time Seen by Provider:  18:49


Travel History


International Travel<30 days:  No


Contact w/Intl Traveler<30days:  No


Traveled to known affect area:  No





History of Present Illness


HPI


82-year-old male complains of bleeding from the right arm.  Few days prior a 25 

pound fan fell onto the right forearm leading to a skin tear.  Patient takes 

Coumadin.  Pressure dressings have been applied however persistent using 

soaking through the bandages reported.





PFSH


Past Medical History


Hx Anticoagulant Therapy:  Yes (COUMADIN)


Autoimmune Disease:  No


Cancer:  Yes (SKIN)


Cardiac Catheterization:  Yes


Cardiovascular Problems:  Yes (CAD/PACER/DEFIBRILLATOR)


High Cholesterol:  Yes


Cerebrovascular Accident:  Yes


Coronary Artery Disease:  Yes


Diabetes:  Yes


Patient Takes Glucophage:  No


Diminished Hearing:  Yes


Genitourinary:  No


Immune Disorder:  No


Implanted Vascular Access Dvce:  Yes


Musculoskeletal:  No


Neurologic:  Yes


Psychiatric:  No


Reproductive:  No


Respiratory:  No


Immunizations Current:  Yes


Thyroid Disease:  Yes (HYPOTHYROID)


Tetanus Vaccination:  Unknown


Influenza Vaccination:  Yes





Past Surgical History


AICD:  Yes


Cardiac Surgery:  Yes (CABG X2/CARDIAC STENTS X7)


Coronary Artery Bypass Graft:  Yes


Eye Surgery:  Yes (CATARCTS)


Pacemaker:  Yes


Thoracic Surgery:  Yes (AICD)


Other Surgery:  Yes





Social History


Alcohol Use:  No


Tobacco Use:  No


Substance Use:  No





Allergies-Medications


(Allergen,Severity, Reaction):  


Coded Allergies:  


     No Known Allergies (Verified  Allergy, Unknown, 3/3/18)


     MRI PRECAUTION (Verified  Adverse Reaction, Unknown, NON COMPATIBLE 

PACEMAKER DEFIBRILLATOR, 3/4/18)


 MODEL # [id number], SERIAL #[id number], JAN 31 2014


Reported Meds & Prescriptions





Reported Meds & Active Scripts


Active


Clindamycin (Clindamycin HCl) 150 Mg Cap 450 Mg PO Q8HR 7 Days


Reported


Xanax (Alprazolam) 0.5 Mg Tab 0.5 Mg PO HS PRN


Lantus Inj (Insulin Glargine) 1,000 Unit/10 Ml Vial 32 Units SQ DAILY


Preservision Areds (Multiple Vitamins W/ Minerals) 1 Tab 2 Tab PO DAILY


Aspirin 81 Mg Chew 81 Mg CHEW DAILY


Warfarin 5 Mg Tab 5 Mg PO DAILY


[Thyroxine]   25 Mg PO DAILY


Tricor (Fenofibrate) 48 Mg Tab 48 Mg PO DAILY


     Tke with food.


Simvastatin 40 Mg Tab 40 Mg PO HS


Januvia (Sitagliptin Phosphate) 100 Mg Tab 100 Mg PO DAILY


Digoxin 0.25 Mg Tab 0.25 Mg PO DAILY


Vitamin D3 (Cholecalciferol) 1,000 Unit Cap 1,000 Units  DAILY








Review of Systems


Eyes:  No: Blurred Vision


HENT:  No: Sore Throat


Cardiovascular:  No: Chest Pain or Discomfort


Respiratory:  No: Shortness of Breath, Sneezing





Physical Exam


Narrative


GENERAL: Well-nourished well-developed 82-year-old male in no acute distress





Vital Signs








  Date Time  Temp Pulse Resp B/P (MAP) Pulse Ox O2 Delivery O2 Flow Rate FiO2


 


5/20/18 17:55 97.3 75 18 150/75 (100) 97   








SKIN: Warm and dry.  There is superficial skin tear along the right forearm at 

the base it is about 3 cm wide and it is somewhat horseshoe-shaped about 3 cm 

long the greatest length.


HEAD: Normocephalic.


EYES: No scleral icterus. No injection or drainage. 


NECK: Supple, trachea midline. No JVD or lymphadenopathy.


CARDIOVASCULAR: Regular rate and rhythm without murmurs, gallops, or rubs. 


RESPIRATORY: Breath sounds equal bilaterally. No accessory muscle use.


GASTROINTESTINAL: Abdomen soft, non-tender, nondistended. 


MUSCULOSKELETAL: No cyanosis, or edema. 


BACK: Nontender without obvious deformity. No CVA tenderness.








Data


Data


Last Documented VS





Vital Signs








  Date Time  Temp Pulse Resp B/P (MAP) Pulse Ox O2 Delivery O2 Flow Rate FiO2


 


5/20/18 17:55 97.3 75 18 150/75 (100) 97   








Orders





 Orders


Ed Discharge Order (5/20/18 19:34)


Tetanus/Diphtheria Tox Adult (Tetanus/Di (5/20/18 19:45)


Clindamycin (Cleocin) (5/20/18 19:45)








[facility]


Medical Decision Making


Medical Screen Exam Complete:  Yes


Emergency Medical Condition:  Yes


Medical Record Reviewed:  Yes


Differential Diagnosis


Laceration, cellulitis, avulsion


Narrative Course


The wound was irrigated extensively using a UroMax container as well as a 750 

cc normal saline container as well.  Bladder was used after the area of 

continuous use was sutured with a 5-0 Vicryl figure-of-eight stitch was stopped 

using..  Tetanus administered.  Home care instructions provided.  Patient is 

diabetic vasculopathic and 82 years old.  In this scenario course of 

clindamycin considered reasonable.  Should be noted there was a bit of an odor 

at the time of the initial assessment however no significant induration 

erythema or tenderness about the region of avulsion.





Procedures


**Procedure Narrative**


LACERATION


LOCATION: R forearm


LENGTH: < 1Cm


NUMBER OF STITCHES/STAPLES: 1





REPAIR: The area of the laceration was prepped with Betadine and sterilely 

draped.  The laceration was infiltrated with lido w epi.  The wound was 

copiously irrigated and explored without evidence of foreign body, tendon 

injury or neurovascular injury.  The wound was closed using 5-0 vicryl. This 

was a [single layer repair. A sterile dressing was applied. The patient was 

advised to keep the dressing clean and dry. Patient tolerated the procedure 

well.





Diagnosis





 Primary Impression:  


 Laceration of forearm, right


 Qualified Codes:  S51.811A - Laceration without foreign body of right forearm, 

initial encounter


Referrals:  


Primary Care Physician


call for appointment


***Med/Other Pt SpecificInfo:  Prescription(s) given


Scripts


Clindamycin (Clindamycin) 150 Mg Cap


450 MG PO Q8HR for Infection for 7 Days, CAP 0 Refills


   Prov: Fercho Griffin MD         5/20/18


Disposition:  01 DISCHARGE HOME


Condition:  Stable











Fercho Griffin MD May 20, 2018 19:36

## 2018-06-14 ENCOUNTER — HOSPITAL ENCOUNTER (EMERGENCY)
Dept: HOSPITAL 17 - NEPC | Age: 83
LOS: 1 days | Discharge: HOME | End: 2018-06-15
Payer: MEDICARE

## 2018-06-14 VITALS
RESPIRATION RATE: 20 BRPM | DIASTOLIC BLOOD PRESSURE: 65 MMHG | TEMPERATURE: 98.1 F | SYSTOLIC BLOOD PRESSURE: 140 MMHG | HEART RATE: 72 BPM | OXYGEN SATURATION: 97 %

## 2018-06-14 VITALS — WEIGHT: 194.01 LBS | HEIGHT: 73 IN | BODY MASS INDEX: 25.71 KG/M2

## 2018-06-14 DIAGNOSIS — E78.00: ICD-10-CM

## 2018-06-14 DIAGNOSIS — I25.10: ICD-10-CM

## 2018-06-14 DIAGNOSIS — E11.9: ICD-10-CM
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
## 2018-06-14 DIAGNOSIS — Z86.69: ICD-10-CM

## 2018-06-14 DIAGNOSIS — H91.90: ICD-10-CM
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
## 2018-06-14 DIAGNOSIS — Z86.79: ICD-10-CM

## 2018-06-14 DIAGNOSIS — S01.01XA: Primary | ICD-10-CM

## 2018-06-14 DIAGNOSIS — Z79.4: ICD-10-CM

## 2018-06-14 DIAGNOSIS — Z79.01: ICD-10-CM

## 2018-06-14 DIAGNOSIS — E03.9: ICD-10-CM

## 2018-06-14 PROCEDURE — 70450 CT HEAD/BRAIN W/O DYE: CPT

## 2018-06-14 PROCEDURE — 12002 RPR S/N/AX/GEN/TRNK2.6-7.5CM: CPT

## 2018-06-14 NOTE — RADRPT
EXAM DATE:  6/14/2018 10:46 PM EDT

AGE/SEX:        82 years / Male



INDICATIONS:  Trauma, fall. Hit back of head. 



CLINICAL DATA:  This is the patient's initial encounter. Patient reports that signs and symptoms have
 been present for 1 day and indicates a pain score of 5/10. 

                                                                          

MEDICAL/SURGICAL HISTORY:   Carcinoma, skin cancer. Coronary artery disease.   CABG.  Pacemaker.



RADIATION DOSE:  56.35 CTDI (mGy)







COMPAR[place]      [facility] [place]2018.  . 







TECHNIQUE:  CT of the head without contrast.  Using automated exposure control and adjustment of the 
mA and/or kV according to patient size, radiation dose was kept as low as reasonably achievable to ob
tain optimal diagnostic quality images.



FINDINGS: 

Cerebrum:  The ventricles are mildly prominent consistent with atrophy. 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  No 
evidence of midline shift, mass lesion, hemorrhage or acute infarction.  No extraaxial fluid collecti
ons are seen.

Posterior Fossa:  The cerebellum and brainstem are intact.  The 4th ventricle is midline.  The cerebe
llopontine angle is unremarkable.

Extracranial:  The visualized portion of the orbits is intact.

Skull:  The calvaria is intact.  No evidence of skull fracture.



CONCLUSION:

1.  Cerebral atrophy.

2.  No acute intracranial abnormality.

3.  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



Electronically signed by: Dawit Fuller MD  6/14/2018 10:48 PM EDT

## 2018-06-15 NOTE — PD
Physical Exam


Date Seen by Provider:  Juan A 15, 2018


Time Seen by Provider:  00:34


Narrative


Skin: Patient has a 2 cm laceration to the posterior right occiput.





Data


Data


Last Documented VS





Vital Signs








  Date Time  Temp Pulse Resp B/P (MAP) Pulse Ox O2 Delivery O2 Flow Rate FiO2


 


6/14/18 22:22 98.1 72 20 140/65 (90) 97   








Orders





 Orders


Ct Brain W/O Iv Contrast(Rout) (6/14/18 )








MDM


Medical Record Reviewed:  Yes


Supervised Visit with GERARDO:  Yes


Interpretation(s)





Last 24 hours Impressions








Head CT 6/14/18 0000 Signed





Impressions: 





 CONCLUSION:





 1.  Cerebral atrophy.





 2.  No acute intracranial abnormality.





 3.  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





  





 








Differential Diagnosis


MDM: High


Differential diagnoses: Fracture, sprain, strain, dislocation, contusion, 

neurovascular injury


Narrative Course


Patient's lacerations closed sutures


Procedures


**Procedure Narrative**


LACERATION


LOCATION: Right posterior occiput


LENGTH: 2 cm


NUMBER OF STITCHES/STAPLES: 3





REPAIR: The area of the laceration was prepped with Betadine and sterilely 

draped.  The laceration was infiltrated with 1% lidocaine.  The wound was 

copiously irrigated and explored without evidence of foreign body, tendon 

injury or neurovascular injury.  The wound was closed using 3-0 Prolene. This 

was a simple single layer repair. A sterile dressing was applied. The patient 

was advised to keep the dressing clean and dry. Patient tolerated the procedure 

well.





***Additional Instruction:  


Rest.


Elevation.


Tylenol  for pain.


Daily wound care with soap, water, Neosporin.


Sutures out in 7-9 days.


Return to the ER if any problems.


Condition:  Stable











Jameson Bryan Juan A 15, 2018 00:35

## 2024-03-04 NOTE — PD
Rommel called requesting a refill of the below medication which has been pended for you:     Requested Prescriptions     Pending Prescriptions Disp Refills    escitalopram (LEXAPRO) 10 MG tablet [Pharmacy Med Name: Escitalopram Oxalate 10 MG Oral Tablet] 30 tablet 0     Sig: Take 1 tablet by mouth once daily       Last Appointment Date: 12/6/2023  Next Appointment Date: 6/10/2024    No Known Allergies   HPI


Chief Complaint:  Head Injury


Time Seen by Provider:  22:31


Travel History


International Travel<30 days:  No


Contact w/Intl Traveler<30days:  No


Traveled to known affect area:  No





History of Present Illness


HPI


Patient is a 82-year-old male who had a trip and fall at home.


He struck his posterior scalp and head on the ground.


He is on Coumadin.


The bleeding was stopped prior to arrival.


Patient had no loss of consciousness he denies syncope.


Chest pain abdominal pain or shortness of breath prior to the event





PFSH


Past Medical History


Hx Anticoagulant Therapy:  Yes (COUMADIN)


Autoimmune Disease:  No


Cancer:  Yes (SKIN)


Cardiac Catheterization:  Yes


Cardiovascular Problems:  Yes (CAD/PACER/DEFIBRILLATOR)


High Cholesterol:  Yes


Cerebrovascular Accident:  Yes


Coronary Artery Disease:  Yes


Diabetes:  Yes


Diminished Hearing:  Yes


Genitourinary:  No


Immune Disorder:  No


Implanted Vascular Access Dvce:  Yes


Musculoskeletal:  No


Neurologic:  Yes


Psychiatric:  No


Reproductive:  No


Respiratory:  No


Immunizations Current:  Yes


Thyroid Disease:  Yes (HYPOTHYROID)





Past Surgical History


AICD:  Yes


Cardiac Surgery:  Yes (CABG X2/CARDIAC STENTS X7)


Coronary Artery Bypass Graft:  Yes


Eye Surgery:  Yes (CATARCTS)


Pacemaker:  Yes


Thoracic Surgery:  Yes (AICD)


Other Surgery:  Yes





Social History


Alcohol Use:  No


Tobacco Use:  No


Substance Use:  No





Allergies-Medications


(Allergen,Severity, Reaction):  


Coded Allergies:  


     No Known Allergies (Verified  Allergy, Unknown, 6/14/18)


     MRI PRECAUTION (Verified  Adverse Reaction, Unknown, NON COMPATIBLE 

PACEMAKER DEFIBRILLATOR, 6/14/18)


 MODEL # [id number], SERIAL #[id number], JAN [birth year] 2014


Reported Meds & Prescriptions





Reported Meds & Active Scripts


Active


Reported


Levothyroxine (Levothyroxine Sodium) 25 Mcg Tab 25 Mcg PO DAILY


Xanax (Alprazolam) 0.5 Mg Tab 0.5 Mg PO HS PRN


Lantus Inj (Insulin Glargine) 1,000 Unit/10 Ml Vial 32 Units SQ DAILY


Preservision Areds (Multiple Vitamins W/ Minerals) 1 Tab 2 Tab PO DAILY


Aspirin 81 Mg Chew 81 Mg CHEW DAILY


Warfarin 5 Mg Tab 5 Mg PO DAILY


Tricor (Fenofibrate) 48 Mg Tab 48 Mg PO DAILY


     Tke with food.


Simvastatin 40 Mg Tab 40 Mg PO HS


Januvia (Sitagliptin Phosphate) 100 Mg Tab 100 Mg PO DAILY


Digoxin 0.25 Mg Tab 0.25 Mg PO DAILY


Vitamin D3 (Cholecalciferol) 1,000 Unit Cap 1,000 Units  DAILY








Review of Systems


Except as stated in HPI:  all other systems reviewed are Neg


Skin:  Positive Other (Scalp laceration)





Physical Exam


Narrative


GENERAL: 82-year-old male in no distress


SKIN: Focused skin assessment warm/dry.


HEAD: Small laceration which is clotted over on his posterior parietal scalp on 

the right. Normocephalic. 


EYES: Pupils equal and round. No scleral icterus. No injection or drainage. 


ENT: No nasal bleeding or discharge.  Mucous membranes pink and moist.  Hearing 

aids (very hard of hearing)


NECK: Trachea midline. No JVD. 


CARDIOVASCULAR: Regular rate and rhythm.  No murmur appreciated.


RESPIRATORY: No accessory muscle use. Clear to auscultation. Breath sounds 

equal bilaterally. 


GASTROINTESTINAL: Abdomen soft, non-tender, nondistended. Hepatic and splenic 

margins not palpable. 


NEUROLOGICAL: Awake and alert. No obvious cranial nerve deficits.  Motor 

grossly within normal limits. Normal speech.





Data


Data


Last Documented VS





Vital Signs








  Date Time  Temp Pulse Resp B/P (MAP) Pulse Ox O2 Delivery O2 Flow Rate FiO2


 


6/14/18 22:22 98.1 72 20 140/65 (90) 97   








Orders





 Orders


Ct Brain W/O Iv Contrast(Rout) (6/14/18 )


Ed Discharge Order (6/15/18 00:41)





Labs








MDM


Medical Decision Making


Medical Screen Exam Complete:  Yes


Emergency Medical Condition:  Yes


Differential Diagnosis


ICH, Laceration


Narrative Course


Patient seen and evaluated in the emergency department.


CT head was negative.


His laceration was repaired please see suture repair note by Jameson Riley





Diagnosis





 Primary Impression:  


 Laceration


 Additional Impression:  


 Fall


 Qualified Codes:  W19.XXXA - Unspecified fall, initial encounter


Patient Instructions:  General Instructions, Laceration (ED)





***Additional Instructions:  


Sutures out in 5-7 days


Disposition:  01 DISCHARGE HOME


Condition:  Good











AGUS Kaplan DO Jun 14, 2018 23:08